# Patient Record
Sex: MALE | Race: BLACK OR AFRICAN AMERICAN | NOT HISPANIC OR LATINO | Employment: UNEMPLOYED | URBAN - METROPOLITAN AREA
[De-identification: names, ages, dates, MRNs, and addresses within clinical notes are randomized per-mention and may not be internally consistent; named-entity substitution may affect disease eponyms.]

---

## 2019-07-03 ENCOUNTER — HOSPITAL ENCOUNTER (EMERGENCY)
Facility: HOSPITAL | Age: 24
Discharge: HOME | End: 2019-07-03
Attending: EMERGENCY MEDICINE | Admitting: EMERGENCY MEDICINE
Payer: COMMERCIAL

## 2019-07-03 VITALS
TEMPERATURE: 98.5 F | SYSTOLIC BLOOD PRESSURE: 138 MMHG | WEIGHT: 210 LBS | DIASTOLIC BLOOD PRESSURE: 90 MMHG | RESPIRATION RATE: 18 BRPM | HEART RATE: 78 BPM | OXYGEN SATURATION: 98 % | BODY MASS INDEX: 24.3 KG/M2 | HEIGHT: 78 IN

## 2019-07-03 DIAGNOSIS — S30.812A ABRASION OF PENIS, INITIAL ENCOUNTER: ICD-10-CM

## 2019-07-03 DIAGNOSIS — Z20.2 EXPOSURE TO CHLAMYDIA: Primary | ICD-10-CM

## 2019-07-03 PROCEDURE — 36415 COLL VENOUS BLD VENIPUNCTURE: CPT | Performed by: EMERGENCY MEDICINE

## 2019-07-03 PROCEDURE — 99283 EMERGENCY DEPT VISIT LOW MDM: CPT | Performed by: EMERGENCY MEDICINE

## 2019-07-03 PROCEDURE — 87591 N.GONORRHOEAE DNA AMP PROB: CPT | Performed by: EMERGENCY MEDICINE

## 2019-07-03 PROCEDURE — 96372 THER/PROPH/DIAG INJ SC/IM: CPT

## 2019-07-03 PROCEDURE — 86592 SYPHILIS TEST NON-TREP QUAL: CPT | Performed by: EMERGENCY MEDICINE

## 2019-07-03 PROCEDURE — 87491 CHLMYD TRACH DNA AMP PROBE: CPT | Performed by: EMERGENCY MEDICINE

## 2019-07-03 PROCEDURE — 99283 EMERGENCY DEPT VISIT LOW MDM: CPT

## 2019-07-03 RX ORDER — DOXYCYCLINE HYCLATE 100 MG/1
100 CAPSULE ORAL ONCE
Status: COMPLETED | OUTPATIENT
Start: 2019-07-03 | End: 2019-07-03

## 2019-07-03 RX ORDER — DOXYCYCLINE HYCLATE 100 MG
100 TABLET ORAL 2 TIMES DAILY
Qty: 20 TABLET | Refills: 0 | Status: SHIPPED | OUTPATIENT
Start: 2019-07-03 | End: 2019-07-13

## 2019-07-03 RX ADMIN — DOXYCYCLINE HYCLATE 100 MG: 100 CAPSULE ORAL at 21:01

## 2019-07-03 RX ADMIN — LIDOCAINE HYDROCHLORIDE 250 MG: 10 INJECTION, SOLUTION EPIDURAL; INFILTRATION; INTRACAUDAL; PERINEURAL at 21:02

## 2019-07-04 LAB
C TRACH DNA SPEC QL NAA+PROBE: NEGATIVE
N GONORRHOEA DNA SPEC QL NAA+PROBE: NEGATIVE

## 2019-07-04 NOTE — ED PROVIDER NOTES
History  Chief Complaint   Patient presents with    Exposure to STD     Patient reports that he went to [facility] for exposure to chlyamidia and diagnosed with herpes  Patient wants to get a confirmation  HPI patient is a 54-year-old male, he reports dry intercourse recently, he reports that he has an area of induration on the left side of his penis  Patient apparently was at another hospital told he may have herpes in that area  Patient was not given any medicine  He reports he did have some diagnostic testing but he does not know what kind  He reports that they did not give him any medicine  Patient reports he came here for 2nd opinion  Patient reports his girlfriend does have chlamydia any believes he was exposed to chlamydia  He denies any fever or chills  He reports that the lesion along the left side of his penis which she believes may be an abrasion from dry intercourse  Past medical history previously healthy  Family history noncontributory  Social history, recent exposure to chlamydia, nonsmoker    None       History reviewed  No pertinent past medical history  History reviewed  No pertinent surgical history  History reviewed  No pertinent family history  I have reviewed and agree with the history as documented  Social History     Tobacco Use    Smoking status: Never Smoker    Smokeless tobacco: Never Used   Substance Use Topics    Alcohol use: Never     Frequency: Never    Drug use: Never        Review of Systems   Skin: Positive for rash and wound  Physical Exam  Physical Exam   Constitutional: He is oriented to person, place, and time  He appears well-developed and well-nourished  HENT:   Head: Normocephalic  Right Ear: External ear normal    Left Ear: External ear normal    Nose: Nose normal    Mouth/Throat: Oropharynx is clear and moist    Eyes: Pupils are equal, round, and reactive to light  EOM and lids are normal    Neck: Normal range of motion  Neck supple  Pulmonary/Chest: Effort normal  No respiratory distress  Genitourinary:   Genitourinary Comments: There is no discharge, normal testicles, no testicular swelling or pain no sign of torsion, there is an area along the patient's penis on the left side along the shaft which shows some small breakdown with some scabbing  The lesions are not herpetic I believe this may be an abrasion  I cannot rule out lesion of syphilis  Discussed with patient advised testing he agrees  Musculoskeletal: Normal range of motion  He exhibits no deformity  Neurological: He is alert and oriented to person, place, and time  Skin: Skin is warm and dry  Psychiatric: He has a normal mood and affect  Nursing note and vitals reviewed  Vital Signs  ED Triage Vitals [07/03/19 2027]   Temperature Pulse Respirations Blood Pressure SpO2   98 5 °F (36 9 °C) 78 18 138/90 98 %      Temp Source Heart Rate Source Patient Position - Orthostatic VS BP Location FiO2 (%)   Oral Monitor Sitting Right arm --      Pain Score       No Pain           Vitals:    07/03/19 2027   BP: 138/90   Pulse: 78   Patient Position - Orthostatic VS: Sitting         Visual Acuity      ED Medications  Medications   cefTRIAXone (ROCEPHIN) 250 mg in lidocaine (PF) (XYLOCAINE-MPF) 1 % IM only syringe (250 mg Intramuscular Given 7/3/19 2102)   doxycycline hyclate (VIBRAMYCIN) capsule 100 mg (100 mg Oral Given 7/3/19 2101)       Diagnostic Studies  Results Reviewed     Procedure Component Value Units Date/Time    Chlamydia/GC amplified DNA by PCR [[id number]] Collected:  07/03/19 2056    Lab Status: In process Specimen:  Urine, Other Updated:  07/03/19 2114    RPR [[id number]] Collected:  07/03/19 2100    Lab Status:   In process Specimen:  Blood from Arm, Right Updated:  07/03/19 2112                 No orders to display              Procedures  Procedures       ED Course                               MDM medical decision making 40-year-old male presents emergency department with a history of exposure to chlamydia, now has a rash on his penis, discussed testing for syphilis the patient agrees  Patient was exposed to chlamydia advised treatment with Rocephin and doxycycline which will cover both chlamydia and gonorrhea  He agrees  Discussed test will take a while we will call him for positive result  Disposition  Final diagnoses:   Exposure to chlamydia   Abrasion of penis, initial encounter     Time reflects when diagnosis was documented in both MDM as applicable and the Disposition within this note     Time User Action Codes Description Comment    7/3/2019  8:42 PM Agus Caprice Add [Z20 2] Exposure to chlamydia     7/3/2019  8:42 PM Agus Caprice Add [S30 812A] Abrasion of penis, initial encounter       ED Disposition     ED Disposition Condition Date/Time Comment    Discharge Stable Wed Jul 3, 2019  8:43 PM [facility] discharge to home/self care  Follow-up Information     Follow up With Specialties Details Why Contact Mekhi Mckeon DO Internal Medicine   [address] [place] [address]  [phone]            Discharge Medication List as of 7/3/2019  8:43 PM      START taking these medications    Details   doxycycline hyclate (VIBRA-TABS) 100 mg tablet Take 1 tablet (100 mg total) by mouth 2 (two) times a day for 10 days, Starting Wed 7/3/2019, Until Sat 7/13/2019, Print           No discharge procedures on file      ED Provider  Electronically Signed by           Kathryn Pacheco MD  07/03/19 0159

## 2019-07-04 NOTE — DISCHARGE INSTRUCTIONS
Doxycycline twice daily for the next 10 days to fight infection  Cultures will result over the next 3-4 days  Follow up with family doctor

## 2019-07-04 NOTE — ED NOTES
Patient assessed and discharge by provider independent from nursing care     Juju Locke RN  07/03/19 3147

## 2019-07-05 LAB — RPR SER QL: NORMAL
